# Patient Record
Sex: MALE | Race: ASIAN | NOT HISPANIC OR LATINO | Employment: PART TIME | URBAN - METROPOLITAN AREA
[De-identification: names, ages, dates, MRNs, and addresses within clinical notes are randomized per-mention and may not be internally consistent; named-entity substitution may affect disease eponyms.]

---

## 2021-12-13 PROBLEM — N47.1 PHIMOSIS: Status: ACTIVE | Noted: 2021-12-13

## 2021-12-13 PROBLEM — N47.2 PARAPHIMOSIS: Status: ACTIVE | Noted: 2021-12-13

## 2022-01-10 PROBLEM — K11.8 PAROTID MASS: Status: ACTIVE | Noted: 2022-01-10

## 2022-01-10 PROBLEM — Z87.891 SMOKING HISTORY: Status: ACTIVE | Noted: 2022-01-10

## 2022-01-10 PROBLEM — R22.1 NECK MASS: Status: ACTIVE | Noted: 2022-01-10

## 2022-03-15 PROBLEM — R31.29 MICROSCOPIC HEMATURIA: Status: ACTIVE | Noted: 2022-03-15

## 2022-08-07 PROBLEM — B19.10 HEPATITIS B: Status: ACTIVE | Noted: 2022-08-07

## 2022-09-23 ENCOUNTER — SPECIALTY PHARMACY (OUTPATIENT)
Dept: PHARMACY | Facility: CLINIC | Age: 32
End: 2022-09-23

## 2022-09-23 NOTE — TELEPHONE ENCOUNTER
Storm, this is Moises Pop with [facility].  We are working on your prescription that your doctor has sent us. We will be working with your insurance to get this approved for you. We will be calling you along the way with updates on your medication.  If you have any questions, you can reach us at [phone].    Welcome call outcome: Patient/caregiver reached

## 2022-09-23 NOTE — TELEPHONE ENCOUNTER
Vemlidy Rx received.  Contacted patient for welcome call and to determine if patient has prescription insurance.  Patient stated he does not currently.  Informed patient we will explore options for patient financial assistance and [facility] will call back with any updates.  Patient agreed to this.

## 2022-09-30 NOTE — TELEPHONE ENCOUNTER
Patient reached to proceed with PAP. Email PAP to patient email on file and fax provider portion to [phone]

## 2022-10-05 NOTE — TELEPHONE ENCOUNTER
Incoming call from patient. Patient stated he has gathered his income documents and will be sending PAP application back to [facility] soon.    Family

## 2022-10-11 NOTE — TELEPHONE ENCOUNTER
Patient reached- informed of missing signature and income on PAP that was email back. Patient will resend once possible back completed patient portion and income

## 2022-10-18 NOTE — TELEPHONE ENCOUNTER
Received Roberto CUNNINGHAM Approval from [employer] 10/18/22 to 10/17/23 through [facility] fax # [phone] pending prescription needed.     Patient informed of approval. Patient informed PAP had called him and will update him once received prescription. Informing pharmacist to transfer to [facility].

## 2022-10-18 NOTE — TELEPHONE ENCOUNTER
Vemlidy Rx routed to [facility] [facility].  Will close patient's enrollment at [facility].

## 2022-10-18 NOTE — TELEPHONE ENCOUNTER
Incoming call from [employer] [facility] Rod stated she will be approving patient for PAP but did not receive RX. Alerting [facility].

## 2022-11-08 PROBLEM — E11.9 TYPE 2 DIABETES MELLITUS WITHOUT COMPLICATION, WITHOUT LONG-TERM CURRENT USE OF INSULIN: Status: ACTIVE | Noted: 2022-11-08

## 2022-11-08 PROBLEM — F10.90 ALCOHOL USE: Status: ACTIVE | Noted: 2022-11-08

## 2022-11-08 PROBLEM — Z78.9 ALCOHOL USE: Status: ACTIVE | Noted: 2022-11-08

## 2023-05-05 ENCOUNTER — SOCIAL WORK (OUTPATIENT)
Dept: ADMINISTRATIVE | Facility: OTHER | Age: 33
End: 2023-05-05

## 2023-05-05 NOTE — PROGRESS NOTES
SW received a consult for pt regarding financial assistance/insurance. SW placed a call to pt @ [phone] to discuss. Pt states that he has already spoken with financial counselor and will follow up with submitting documents with proof of income. No other needs mentioned at this time.     ARNOL Maya    [phone] (phone)  [phone] (fax)

## 2023-05-16 PROBLEM — D11.0 PAROTID PLEOMORPHIC ADENOMA: Status: ACTIVE | Noted: 2023-05-16

## 2023-12-22 ENCOUNTER — PATIENT MESSAGE (OUTPATIENT)
Dept: ADMINISTRATIVE | Facility: HOSPITAL | Age: 33
End: 2023-12-22

## 2024-01-03 PROBLEM — R80.9 MICROALBUMINURIA: Status: ACTIVE | Noted: 2024-01-03

## 2024-01-03 PROBLEM — E11.29 DIABETES MELLITUS WITH KIDNEY COMPLICATION: Status: ACTIVE | Noted: 2024-01-03

## 2024-01-03 PROBLEM — Z00.00 HEALTHCARE MAINTENANCE: Status: ACTIVE | Noted: 2024-01-03

## 2024-01-03 PROBLEM — I83.90 VARICOSE VEINS OF CALF: Status: ACTIVE | Noted: 2024-01-03

## 2024-04-08 PROBLEM — Z00.00 HEALTHCARE MAINTENANCE: Status: RESOLVED | Noted: 2024-01-03 | Resolved: 2024-04-08

## 2024-06-25 ENCOUNTER — PATIENT OUTREACH (OUTPATIENT)
Dept: ADMINISTRATIVE | Facility: HOSPITAL | Age: 34
End: 2024-06-25

## 2024-06-25 DIAGNOSIS — E11.9 TYPE 2 DIABETES MELLITUS WITHOUT COMPLICATION, WITHOUT LONG-TERM CURRENT USE OF INSULIN: Primary | ICD-10-CM

## 2024-06-26 ENCOUNTER — SOCIAL WORK (OUTPATIENT)
Dept: ADMINISTRATIVE | Facility: OTHER | Age: 34
End: 2024-06-26

## 2024-06-26 NOTE — PROGRESS NOTES
SW received a consult for assistance with insurance. SW placed a call to pt x 2 to provide assistance. Pt did not answer. SW left a voice message with call back #. SW awaiting return call.     ARNOL Maya    [phone] (phone)  [phone] (fax)

## 2024-06-26 NOTE — PROGRESS NOTES
SW received return call from pt in regards to assistance with insurance. SW provided education for the application process of applying for Medicaid and/or Free[employer]. SW provided contact information for financial counselor(s) to further assist. Pt verbalized understanding. No other needs at this time.      Financial Assistance   [phone] ([facility] location)   [phone] ([facility])      ARNOL Maya    [phone] (phone)  [phone] (fax)

## 2024-06-29 PROBLEM — H40.023 OAG (OPEN ANGLE GLAUCOMA) SUSPECT, HIGH RISK, BILATERAL: Status: ACTIVE | Noted: 2024-06-29

## 2024-07-13 PROBLEM — H40.013 OAG (OPEN ANGLE GLAUCOMA) SUSPECT, LOW RISK, BILATERAL: Status: ACTIVE | Noted: 2024-07-13

## 2025-01-23 ENCOUNTER — OUTPATIENT CASE MANAGEMENT (OUTPATIENT)
Dept: ADMINISTRATIVE | Facility: OTHER | Age: 35
End: 2025-01-23

## 2025-01-23 NOTE — PROGRESS NOTES
Received a consult regarding pt need assistance with insurance and lab work; Sw called pt at [phone]; no response via phone; left message and contact information for a return call; Rick will continue to follow pt to assist with needs and concerns.    ARNOL Nowak    Ext. [phone]  Pager #[phone]

## 2025-01-24 ENCOUNTER — OUTPATIENT CASE MANAGEMENT (OUTPATIENT)
Dept: ADMINISTRATIVE | Facility: OTHER | Age: 35
End: 2025-01-24

## 2025-01-24 NOTE — PROGRESS NOTES
Spoke with Financial Counselor (Tacos Agee) regarding assisting pt with applying for Medicaid and was told he assisted pt in the past, in which pt never completed Financial Assistance Application and recommended that pt come to complete application for Medicaid and/or Financial Assistance; Sw called pt at [phone]; no response via phone; left message and contact information for a return call; Sw will continue to follow pt to assist with needs and concerns.    ARNOL Nowak    Ext. [phone]  Pager #[phone]

## 2025-01-29 ENCOUNTER — OUTPATIENT CASE MANAGEMENT (OUTPATIENT)
Dept: ADMINISTRATIVE | Facility: OTHER | Age: 35
End: 2025-01-29

## 2025-05-21 ENCOUNTER — PATIENT MESSAGE (OUTPATIENT)
Dept: ADMINISTRATIVE | Facility: HOSPITAL | Age: 35
End: 2025-05-21